# Patient Record
Sex: FEMALE | Race: OTHER | NOT HISPANIC OR LATINO
[De-identification: names, ages, dates, MRNs, and addresses within clinical notes are randomized per-mention and may not be internally consistent; named-entity substitution may affect disease eponyms.]

---

## 2017-08-04 PROBLEM — Z00.00 ENCOUNTER FOR PREVENTIVE HEALTH EXAMINATION: Status: ACTIVE | Noted: 2017-08-04

## 2017-10-04 ENCOUNTER — APPOINTMENT (OUTPATIENT)
Dept: ENDOCRINOLOGY | Facility: CLINIC | Age: 27
End: 2017-10-04

## 2018-02-01 ENCOUNTER — APPOINTMENT (OUTPATIENT)
Dept: ENDOCRINOLOGY | Facility: CLINIC | Age: 28
End: 2018-02-01
Payer: COMMERCIAL

## 2018-02-01 VITALS
DIASTOLIC BLOOD PRESSURE: 75 MMHG | HEART RATE: 63 BPM | TEMPERATURE: 98.4 F | OXYGEN SATURATION: 99 % | HEIGHT: 68 IN | WEIGHT: 181 LBS | BODY MASS INDEX: 27.43 KG/M2 | SYSTOLIC BLOOD PRESSURE: 110 MMHG

## 2018-02-01 DIAGNOSIS — Z86.39 PERSONAL HISTORY OF OTHER ENDOCRINE, NUTRITIONAL AND METABOLIC DISEASE: ICD-10-CM

## 2018-02-01 DIAGNOSIS — Z87.891 PERSONAL HISTORY OF NICOTINE DEPENDENCE: ICD-10-CM

## 2018-02-01 DIAGNOSIS — F41.9 ANXIETY DISORDER, UNSPECIFIED: ICD-10-CM

## 2018-02-01 DIAGNOSIS — Z83.49 FAMILY HISTORY OF OTHER ENDOCRINE, NUTRITIONAL AND METABOLIC DISEASES: ICD-10-CM

## 2018-02-01 PROCEDURE — 99214 OFFICE O/P EST MOD 30 MIN: CPT

## 2018-02-04 PROBLEM — Z83.49 FAMILY HISTORY OF HYPOTHYROIDISM: Status: ACTIVE | Noted: 2018-02-04

## 2018-02-04 PROBLEM — Z86.39 HISTORY OF GRAVES' DISEASE: Status: RESOLVED | Noted: 2018-02-04 | Resolved: 2018-02-04

## 2018-02-04 PROBLEM — F41.9 ANXIETY DISORDER: Status: ACTIVE | Noted: 2018-02-04

## 2018-02-04 PROBLEM — Z87.891 FORMER SMOKER: Status: ACTIVE | Noted: 2018-02-04

## 2018-02-04 RX ORDER — ESCITALOPRAM OXALATE 10 MG/1
10 TABLET ORAL DAILY
Refills: 0 | Status: ACTIVE | COMMUNITY
Start: 2018-02-04

## 2018-02-11 ENCOUNTER — FORM ENCOUNTER (OUTPATIENT)
Age: 28
End: 2018-02-11

## 2018-02-12 ENCOUNTER — OUTPATIENT (OUTPATIENT)
Dept: OUTPATIENT SERVICES | Facility: HOSPITAL | Age: 28
LOS: 1 days | End: 2018-02-12
Payer: COMMERCIAL

## 2018-02-12 ENCOUNTER — APPOINTMENT (OUTPATIENT)
Dept: ULTRASOUND IMAGING | Facility: HOSPITAL | Age: 28
End: 2018-02-12

## 2018-02-12 PROCEDURE — 76536 US EXAM OF HEAD AND NECK: CPT

## 2018-02-12 PROCEDURE — 76536 US EXAM OF HEAD AND NECK: CPT | Mod: 26

## 2018-02-23 ENCOUNTER — CLINICAL ADVICE (OUTPATIENT)
Age: 28
End: 2018-02-23

## 2018-03-05 ENCOUNTER — CLINICAL ADVICE (OUTPATIENT)
Age: 28
End: 2018-03-05

## 2019-05-23 ENCOUNTER — FORM ENCOUNTER (OUTPATIENT)
Age: 29
End: 2019-05-23

## 2019-05-24 ENCOUNTER — OUTPATIENT (OUTPATIENT)
Dept: OUTPATIENT SERVICES | Facility: HOSPITAL | Age: 29
LOS: 1 days | End: 2019-05-24

## 2019-05-24 ENCOUNTER — APPOINTMENT (OUTPATIENT)
Dept: ULTRASOUND IMAGING | Facility: CLINIC | Age: 29
End: 2019-05-24
Payer: COMMERCIAL

## 2019-05-24 PROCEDURE — 76536 US EXAM OF HEAD AND NECK: CPT | Mod: 26

## 2019-05-28 ENCOUNTER — APPOINTMENT (OUTPATIENT)
Dept: ENDOCRINOLOGY | Facility: CLINIC | Age: 29
End: 2019-05-28
Payer: COMMERCIAL

## 2019-05-28 VITALS
BODY MASS INDEX: 29.7 KG/M2 | WEIGHT: 196 LBS | OXYGEN SATURATION: 99 % | HEIGHT: 68 IN | SYSTOLIC BLOOD PRESSURE: 121 MMHG | TEMPERATURE: 98.9 F | DIASTOLIC BLOOD PRESSURE: 77 MMHG | HEART RATE: 74 BPM

## 2019-05-28 DIAGNOSIS — E55.9 VITAMIN D DEFICIENCY, UNSPECIFIED: ICD-10-CM

## 2019-05-28 DIAGNOSIS — E06.3 AUTOIMMUNE THYROIDITIS: ICD-10-CM

## 2019-05-28 DIAGNOSIS — J30.2 OTHER SEASONAL ALLERGIC RHINITIS: ICD-10-CM

## 2019-05-28 DIAGNOSIS — E04.2 NONTOXIC MULTINODULAR GOITER: ICD-10-CM

## 2019-05-28 PROCEDURE — 99214 OFFICE O/P EST MOD 30 MIN: CPT

## 2019-05-28 RX ORDER — MONTELUKAST 10 MG/1
10 TABLET, FILM COATED ORAL DAILY
Qty: 90 | Refills: 3 | Status: ACTIVE | COMMUNITY
Start: 2019-05-28

## 2019-05-29 PROBLEM — E55.9 VITAMIN D DEFICIENCY: Status: ACTIVE | Noted: 2019-05-29

## 2019-05-29 PROBLEM — E04.2 MULTINODULAR GOITER: Status: ACTIVE | Noted: 2019-05-10

## 2019-05-29 PROBLEM — E06.3 HASHIMOTO'S THYROIDITIS: Status: ACTIVE | Noted: 2018-02-01

## 2019-05-29 RX ORDER — CHROMIUM 200 MCG
1000 TABLET ORAL DAILY
Qty: 90 | Refills: 3 | Status: ACTIVE | COMMUNITY
Start: 2019-05-29

## 2019-05-29 NOTE — DATA REVIEWED
[FreeTextEntry1] : [facility]  (5/21/19)\par \par Free T4 1.1 ng/dl  (0.8-1.8)\par TSH 1.58 uU/ml  (0.4-4.5)\par 25-D level below target range at 28 ng/ml\par \par Thyroid ultrasound ([facility]--5/4/19)\par \par Normal-sized gland with heterogeneous echotexture.\par Right upper pole nodule stable in size at 1 cm, no longer appears to have irregular margins, but is now described as taller rather than wide\par The 6 mm L lobe nodule is also stable in size, does not appear to have irregular margins.

## 2019-05-29 NOTE — ASSESSMENT
[FreeTextEntry1] : 1) Hashimoto's disease:  Thyroid gland is normal in size,  and she remains clinically and biochemically euthyroid.  \par --Given the underlying chronic thyroiditis and her history of hyperthyroidism (presumably Graves' disease) in the past, will continue to follow with serial TFTs.  Pt is aware of the risk of thyroid dysfunction in the future.\par \par 2) Multinodular goiter:  Both nodules are stable in size and no longer show irregular margins on ultrasound.  The description of the R lobe nodule as being taller rather than wide would ordinarily be of concern, but the nodule was biopsied last year and showed only changes consistent with Hashimoto's disease.\par --Continue to follow with serial ultrasounds.\par \par 3) Vitamin D deficiency:  25-D level is slightly below the target range of > 30 ng/ml.  Pt is not currently taking any vitamin D or calcium supplements.  Her diet is also fairly low in dairy products.\par --To start vitamin D supplementation at 1000 units/day\par --To also start calcium supplementation (preferably a preparation with added vitamin D) 500-600 mg/day\par \par 4) Weight gain:  Discussed diet in detail.  Pt realizes that most of her extra calories are coming from snacking and from lapses on weekends.  Suggested that she obtain formal nutritional counseling, however, primarily to help her make intelligent menu choices when eating on the road.  \par Also needs to have lipid levels checked.  Will be seeing a new PCP next month following her return to [place]\par \par See for follow-up in 1 year.  TFTs and 25-D level before the visit. [FreeTextEntry2] : Diet

## 2019-05-29 NOTE — HISTORY OF PRESENT ILLNESS
[FreeTextEntry1] : 28-year-old woman with a history of hyperthyroidism diagnosed at age 17, with TFTs having been done because her mother had recently been diagnosed with hypothyroidism and suggested that she be screened.  Her symptoms at that time were limited to heat intolerance and tremulousness.  She was treated with methimazole for the next 4 years, with the drug finally tapered off in 2012.  TFTs which were tested soon after she stopped the methimazole were apparently normal, and she has remained euthyroid since that time.  TPO antibodies were moderately positive at 115 U/ml (normal < 9) in 2016.  A thyroid ultrasound obtained in 2018 showed a 1 cm nodule in the upper pole of the R lobe and an 8 mm nodule in the upper L lobe, both of which were described as having irregular borders.  Biopsies of these lesions were therefore obtained, but showed only Hashimoto's thyroiditis.  \par \par She now returns for follow-up after a hiatus of a year.  Has not had any significant medical issues in the interim, and has no new physical complaints.\carlos Has been living and working in [place], [place] but returns to [place] periodically on business.\carlos Denies any neck swelling in the area of the thyroid gland.  \carlos Has gained more weight.  Says that she goes through intervals when she works out regularly (as much as 2-3 hours/day) and watches her diet, but is not able to lose more than a few lb a month.  She then lapses (out of frustration) for a few weeks, gains the weight back, and then tries to get back on track.\par At this point, she does not have the time to work out because of travel.  (She also has to eat many more of her meals on the road).\par Current diet when she is at home::\par --Breakfast is usually eggs, sometimes with a tortilla.  Skips breakfast a few days/week\par --Lunch is a salad with grilled chicken, sometimes a sandwich.\par --Protein at supper is usually chicken (1 breast), steamed vegetables, sometimes a sweet potato.\par --Caloric intake is still excessive when socializing on weekends.\par --Snacking is also a problem--chips, WW ash, hummus, etc\par --Desserts only on weekends\par --Alcohol only on weekends.\par Went off Lexapro for several months last year, then started back on it because of increased anxiety.\par Family history reviewed--Father still alive and well, mother healthy except for hypothyroidism.\carlos Has not been taking any vitamin D supplements.\par Menses have been regular.\carlos Developed pain and swelling in her L knee several months ago,.  MRI was apparently negative except for an effusion.  Responded to physical therapy, and has no pain at present.

## 2019-05-29 NOTE — REASON FOR VISIT
[Follow-Up: _____] : a [unfilled] follow-up visit [FreeTextEntry1] : Hashimoto's thyroiditis and multinodular goiter

## 2019-05-29 NOTE — REVIEW OF SYSTEMS
[Fatigue] : no fatigue [Decreased Appetite] : appetite not decreased [Fever] : no fever [Chills] : no chills [Blurry Vision] : no blurred vision [Dry Eyes] : no dryness of the eyes [Eyes Itch] : no itching of the eyes [Dysphagia] : no dysphagia [Hearing Loss] : no hearing loss  [Palpitations] : no palpitations [Chest Pain] : no chest pain [Lower Ext Edema] : no lower extremity edema [Shortness Of Breath] : no shortness of breath [Wheezing] : no wheezing was heard [Cough] : no cough [SOB on Exertion] : no shortness of breath during exertion [Nausea] : no nausea [Constipation] : no constipation [Diarrhea] : no diarrhea [Heartburn] : no heartburn [Polyuria] : no polyuria [Irregular Menses] : regular menses [Dysuria] : no dysuria [Nocturia] : no nocturia [Joint Pain] : no joint pain [Joint Stiffness] : no joint stiffness [Muscle Cramps] : no muscle cramps [Myalgia] : no myalgia  [Hirsutism] : no hirsutism [Hair Loss] : no hair loss [Dry Skin] : no dry skin [Headache] : no headaches [Tremors] : no tremors [Pain/Numbness of Digits] : no pain/numbness of digits [Difficulty Walking] : no difficulty walking [Depression] : no depression [Insomnia] : no insomnia [Stress] : no stress [Polydipsia] : no polydipsia [Cold Intolerance] : cold tolerant [Heat Intolerance] : heat tolerant [Easy Bleeding] : no ~M tendency for easy bleeding [Easy Bruising] : no tendency for easy bruising [FreeTextEntry2] : Has gained 15 lb in weight since her last visit a year ago [FreeTextEntry4] : Nasal congestion from pollen allergies [FreeTextEntry9] : See HPI re: previous problem with her left knee [de-identified] : Exacerbation of anxiety symptoms off the Lexapro, but has now restarted it

## 2019-05-29 NOTE — PHYSICAL EXAM
[Alert] : alert [Healthy Appearance] : healthy appearance [Normal Sclera/Conjunctiva] : normal sclera/conjunctiva [PERRL] : pupils equal, round and reactive to light [EOMI] : extra ocular movement intact [No Proptosis] : no proptosis [No Lid Lag] : no lid lag [Normal Hearing] : hearing was normal [Normal Outer Ear/Nose] : the ears and nose were normal in appearance [No Neck Mass] : no neck mass was observed [No LAD] : no lymphadenopathy [Clear to Auscultation] : lungs were clear to auscultation bilaterally [Normal Rate] : heart rate was normal  [Regular Rhythm] : with a regular rhythm [Carotids Normal] : carotid pulses were normal with no bruits [No Edema] : there was no peripheral edema [Not Tender] : non-tender [Soft] : abdomen soft [No HSM] : no hepato-splenomegaly [Normal] : normal and non tender [No CVA Tenderness] : no ~M costovertebral angle tenderness [Normal Gait] : normal gait [No Joint Swelling] : no joint swelling seen [Normal Strength/Tone] : muscle strength and tone were normal [No Involuntary Movements] : no involuntary movements were seen [No Rash] : no rash [No Tremors] : no tremors [Normal Sensation on Monofilament Testing] : normal sensation on monofilament testing of lower extremities [Normal Affect] : the affect was normal [Normal Mood] : the mood was normal [Kyphosis] : no kyphosis present [Foot Ulcers] : no foot ulcers [Acanthosis Nigricans] : no acanthosis nigricans [de-identified] : Moderately overweight [de-identified] : Thyroid normal in size, somewhat firm in consistency, without any distinctly palpable nodularity [de-identified] : No murmurs [de-identified] : DP pulses 2+ bilaterally [de-identified] : No cervical or supraclavicular adenopathy [de-identified] : Vibratory sensation normal over the toes